# Patient Record
Sex: FEMALE | Race: WHITE | NOT HISPANIC OR LATINO | URBAN - METROPOLITAN AREA
[De-identification: names, ages, dates, MRNs, and addresses within clinical notes are randomized per-mention and may not be internally consistent; named-entity substitution may affect disease eponyms.]

---

## 2022-10-26 ENCOUNTER — OFFICE VISIT (OUTPATIENT)
Dept: PSYCHIATRY | Facility: CLINIC | Age: 82
End: 2022-10-26

## 2022-10-26 DIAGNOSIS — F32.A DEPRESSION, UNSPECIFIED DEPRESSION TYPE: Primary | ICD-10-CM

## 2022-10-26 DIAGNOSIS — F41.9 ANXIETY: ICD-10-CM

## 2022-10-26 RX ORDER — CITALOPRAM 40 MG/1
TABLET ORAL
Qty: 90 TABLET | Refills: 0 | Status: SHIPPED | OUTPATIENT
Start: 2022-10-26

## 2022-10-26 RX ORDER — BUPROPION HYDROCHLORIDE 75 MG/1
TABLET ORAL
Qty: 90 TABLET | Refills: 0 | Status: SHIPPED | OUTPATIENT
Start: 2022-10-26

## 2022-10-26 NOTE — PSYCH
[facility]/Hospital: [address]   [address] [facility]    Psychiatric Progress Note  MRN#: [id number]  Medardo Phillips 79 y o  female    This note was not shared with the patient due to reasonable likelihood of causing patient harm   This Patient was seen in the office today at [place] [address] location  This is Transfer patient of Dr Delfina Le are no longer doing phone appointment  Subjective:  Brandon Lund reported she feels fine  History of depression, immediate depression, don't want to get up in morning-   Appetite normal,  Denies SI  HI  Denies worthlessnesss or anhedonia  On Celexa and Wellbutrin for many years  W/o side effects    Anxiety- regarding  health  Denies recent panic attacks  Have Ativan if really anxious  Last used 3 wks ago but for sleep      Medication   Celexa 40mg  Wellbutrin 75mg  Ativan 0 5mg PRN   Levothyroxine 75 mcg,   Tropolol 25mg     Medical Hx:   PVC  Hypothyroidism   COVID history , had two vaccine and booster      Social Hx:  Living Situation : prison home in [place], [place]  ( fitness center, therapy- physical )   Fall in [place] x 2 months ago- did not break any things  Currently get PT   ADL, iADL- independent, she does all the driving   Occupational Status: retired nurse  Hobbies: quilt , reading     Mental Status Evaluation:  General Appearance:  Medardo Phillips is a 79 y o    female casually dressed, adequate hygiene and grooming, looks stated age   Behavior:  pleasant, cooperative, adequate eye contact   Speech:  normal for rate, rhythm, volume, latency, amount   Mood:  normal   Affect:  normal   Thought Process:  normal and logical   Thought Content:  normal   Perceptual Disturbances: no auditory hallucinations, no visual hallucinations, Does not appear responding or preoccupied   Delusions  No   Risk Potential: Suicidal Ideations No  Homicidal Ideations No  Potential for Aggression No     Sensorium:  Oriented to person, place, time/date and situation   Memory:  recent and remote memory grossly intact   Consciousness:  alert and awake   Attention: attention span and concentration are age appropriate   Insight:  fair   Judgment: fair   Gait/Station: normal   Motor Activity: no abnormal movements     There were no vitals filed for this visit  Medications:   No current outpatient medications on file prior to visit  No current facility-administered medications on file prior to visit  Labs: I have personally reviewed all pertinent laboratory/tests results  Most Recent Labs: No results found for: WBC, RBC, HGB, HCT, PLT, RDW, NEUTROABS, SODIUM, K, CL, CO2, BUN, CREATININE, GLUC, GLUF, CALCIUM, AST, ALT, ALKPHOS, TP, ALB, TBILI, CHOLESTEROL, HDL, TRIG, LDLCALC, NONHDLC, VALPROICTOT, CARBAMAZEPIN, LITHIUM, AMMONIA, [id number], FREET4, T3FREE, PREGSERUM, HCG, HCGQUANT, RPR, HGBA1C, EAG    ________________________________________________________________________    Avel Dakin is a 79 yr old   female, high functioning, adequate ADL's, h/o depression and anxiety, presented with mild symptoms on Celexa and Bupropion  DSM5  Depression, unspecified depression type  Anxiety, unspecified     PLAN:   Discussed diagnotic impression  Continued medications as prescribed       [id number] mg       Bupropion 75 mg         Risks, benefits, and possible side effects of medications explained to patient and patient verbalizes understanding  Next Appointment: 3months      [facility]  Face To Face:  9:50 AM -10:14 AM;Documentation Time:  6:43 PM- 6:50 PM    Encounter Duration: Time Spent 24 minutes  Patient  Greater than 50% of total time was spent with the patient

## 2022-10-26 NOTE — PATIENT INSTRUCTIONS
Breanna Gage [id number]   Thanks for presenting to today's Appointment at [place] [address]   Your medications were not changed    Radha Damon next appointment plans to adjust Celexa and Bupropion

## 2023-01-23 DIAGNOSIS — F32.A DEPRESSION, UNSPECIFIED DEPRESSION TYPE: ICD-10-CM

## 2023-01-23 RX ORDER — BUPROPION HYDROCHLORIDE 75 MG/1
TABLET ORAL
Qty: 90 TABLET | Refills: 0 | Status: SHIPPED | OUTPATIENT
Start: 2023-01-23 | End: 2023-01-26 | Stop reason: SDUPTHER

## 2023-01-23 RX ORDER — BUPROPION HYDROCHLORIDE 75 MG/1
TABLET ORAL
Qty: 90 TABLET | Refills: 0 | OUTPATIENT
Start: 2023-01-23

## 2023-01-23 NOTE — TELEPHONE ENCOUNTER
Pt Darcel Dakin 1940 , chart at [facility] was reviewed , h/o depression , sent Bupropion 75mg to [facility] pharmacy      This note was not shared with the patient due to reasonable likelihood of causing patient harm

## 2023-01-25 NOTE — PSYCH
[facility]/Hospital: [address]   [address] [facility]    Psychiatric Progress Note  MRN#: [id number]  Hiren Cantu 79 y o  female    This note was not shared with the patient due to reasonable likelihood of causing patient harm   This Patient was seen in the office today at [place] [address] location  Subjective:  Evelyn Rivera reported some up and down days, and  has cognitive problem- although its okay, Evelyn Rivera does most of the driving  Sometime anxious- if  is talkative as Evelyn Rivera is more quiet  Anxiety is tolerable  Denies physical symptoms due to anxiety  Denies depression-can't recall last time she was depressed, history of 1 episode - can't recall why  - symptom then was able to function, denies SI, HI  Medication: Evelyn Rivera was able to state medication ( Celexa , Ativan , Wellbutrin) -  Thinks medications are effective  Denies side effects         Mental Status Evaluation:  General Appearance:  Hiren Cantu is a 79 y o    female casually dressed, adequate hygiene and grooming, looks stated age   Behavior:  pleasant, cooperative, adequate eye contact   Speech:  normal for rate, rhythm, volume, latency, amount   Mood:  normal   Affect:  normal   Thought Process:  normal and logical   Thought Content:  normal   Perceptual Disturbances: no auditory hallucinations, no visual hallucinations, Does not appear responding or preoccupied   Delusions  No   Risk Potential: Suicidal Ideations No  Homicidal Ideations No  Potential for Aggression No     Sensorium:  Oriented to person, place, time/date and situation   Memory:  recent and remote memory grossly intact   Consciousness:  alert and awake   Attention: attention span and concentration are age appropriate   Insight:  fair   Judgment: fair   Gait/Station: normal   Motor Activity: no abnormal movements     There were no vitals filed for this visit  Medications:   Current Outpatient Medications on File Prior to Visit   Medication Sig Dispense Refill   • buPROPion (WELLBUTRIN) 75 mg tablet Bupropion 75mg : 1 tablet in the morning 90 tablet 0   • citalopram (CeleXA) 40 mg tablet Citalopram 40m tablet by mouth per night 90 tablet 0     No current facility-administered medications on file prior to visit  Labs: I have personally reviewed all pertinent laboratory/tests results  Most Recent Labs: No results found for: WBC, RBC, HGB, HCT, PLT, RDW, NEUTROABS, SODIUM, K, CL, CO2, BUN, CREATININE, GLUC, GLUF, CALCIUM, AST, ALT, ALKPHOS, TP, ALB, TBILI, CHOLESTEROL, HDL, TRIG, LDLCALC, NONHDLC, VALPROICTOT, CARBAMAZEPIN, LITHIUM, AMMONIA, [id number], FREET4, T3FREE, PREGSERUM, HCG, HCGQUANT, RPR, HGBA1C, EAG    ________________________________________________________________________    Opal Galindo is a 79 yr old   female, high functioning, adequate ADL's, h/o depression and anxiety, presented with mild symptoms on Celexa and Bupropion  Today, with stable mood   DSM5  Depression, unspecified depression type  Anxiety, unspecified     PLAN:   Discussed diagnotic impression based on history , external records and clinical findings    [facility]   •  buPROPion (WELLBUTRIN) 75 mg tablet, Bupropion 75mg : 1 tablet in the morning, Disp: 120 tablet, Rfl: 0  •  citalopram (CeleXA) 40 mg tablet, Citalopram 40m tablet po at night, Disp: 120 tablet, Rfl: 0              Treatement: Risks, benefits, and possible side effects of medications explained to patient and patient verbalizes understanding  Next Appointment SLPF: 3months    Today's Appointment@ [facility]  Face To Face:  10:58 AM -11:17 AM;Documentation Time:  5:08 PM- 5:10 PM     Encounter Duration: Time Spent 19 minutes with Patient  Greater than 50% of total time was spent with the patient       MDM  Number of Diagnoses or Management Options  Depression, unspecified depression type: established, improving     Amount and/or Complexity of Data Reviewed  Review and summarize past medical records: yes    Risk of Complications, Morbidity, and/or Mortality  Presenting problems: low  Management options: low

## 2023-01-25 NOTE — PATIENT INSTRUCTIONS
Ronnie Jackson [id number]   Thanks for presenting to today's Appointment at [facility]   Your medications were not changed

## 2023-01-26 ENCOUNTER — OFFICE VISIT (OUTPATIENT)
Dept: PSYCHIATRY | Facility: CLINIC | Age: 83
End: 2023-01-26

## 2023-01-26 DIAGNOSIS — F32.A DEPRESSION, UNSPECIFIED DEPRESSION TYPE: ICD-10-CM

## 2023-01-26 RX ORDER — BUPROPION HYDROCHLORIDE 75 MG/1
TABLET ORAL
Qty: 120 TABLET | Refills: 0 | Status: SHIPPED | OUTPATIENT
Start: 2023-01-26

## 2023-01-26 RX ORDER — CITALOPRAM 40 MG/1
TABLET ORAL
Qty: 120 TABLET | Refills: 0 | Status: SHIPPED | OUTPATIENT
Start: 2023-01-26

## 2023-06-05 DIAGNOSIS — F32.A DEPRESSION, UNSPECIFIED DEPRESSION TYPE: ICD-10-CM

## 2023-06-05 RX ORDER — BUPROPION HYDROCHLORIDE 75 MG/1
TABLET ORAL
Qty: 30 TABLET | Refills: 1 | Status: SHIPPED | OUTPATIENT
Start: 2023-06-05

## 2023-06-05 RX ORDER — CITALOPRAM 40 MG/1
TABLET ORAL
Qty: 30 TABLET | Refills: 1 | Status: SHIPPED | OUTPATIENT
Start: 2023-06-05

## 2023-06-05 NOTE — TELEPHONE ENCOUNTER
Patient of Dr Sabas Covarrubias, who is currently unavailable, requesting RF  Forwarding to available prescriber

## 2023-07-06 DIAGNOSIS — F32.A DEPRESSION, UNSPECIFIED DEPRESSION TYPE: ICD-10-CM

## 2023-07-06 NOTE — TELEPHONE ENCOUNTER
Medication Refill Request     Name of Medication Wellbutrin   Dose/Frequency 75mg daily   Quantity 30  Verified pharmacy   [x]  Verified ordering Provider   [x]  Does patient have enough for the next 3 days? Yes [x] No []  Does patient have a follow-up appointment scheduled?  Yes [x] No []   If so when is appointment: Opal Renner

## 2023-07-07 RX ORDER — BUPROPION HYDROCHLORIDE 75 MG/1
TABLET ORAL
Qty: 30 TABLET | Refills: 1 | Status: SHIPPED | OUTPATIENT
Start: 2023-07-07

## 2023-08-29 DIAGNOSIS — F32.A DEPRESSION, UNSPECIFIED DEPRESSION TYPE: ICD-10-CM

## 2023-08-29 RX ORDER — BUPROPION HYDROCHLORIDE 75 MG/1
TABLET ORAL
Qty: 7 TABLET | Refills: 1 | Status: SHIPPED | OUTPATIENT
Start: 2023-08-29 | End: 2023-09-08 | Stop reason: SDUPTHER

## 2023-09-07 NOTE — PSYCH
[facility]/Hospital: [address] [place] [address], [address] [facility]    Psychiatric Progress Note  MRN#: [id number]  Hillary Mackay 79 y.o. female    This note was not shared with the patient due to reasonable likelihood of causing patient harm   _________________________________________________________________________________________________________________________________  OFFICE APPOINTMENT   Seen today at [address] [facility] location                                                Patient Hillary Mackay ,1940   Prescriber/Physician: Dewey Pal DO Physician Location:   [address] [facility] [address] [place] [address]     • This service was provided in the office. Patient is currently located in the [place], where I am  licensed. • Patient gave consent to proceed with encounter; acknowledge understanding of security and privacy of encounter   • Patient identity was verified as well as the [facility] chart  • Patient verbalized understanding evaluation only involves Psychiatric diagnosing, prescribing, result monitoring   • Patient was informed this is a billable service and legal  ___________________________________________________________________________________________________________________________________     Chief Complaint   Patient presents with   • Depression       Subjective:  Sobia Zambrano complained of down mood; later described as not wanting to do things, low energy, impatience also with the  -typically if he's walking slowly. Sobia Zambrano also reasoned sadness is caused limited activities with the  as they once did. 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 Sobia Zambrano also is  8wks post-opts left knee replacement surgery- currently without pain.  Initially required a walker now she uses a cane and also has supports from the  and daughter regarding activities of daily livings. She had 1 recent fall x 1 wk ago-while "gardening and attempting to grab something- denies having head trauma. Currently oriented x 3       ROS:  Depression: defer to [facility], 4/10 severity, not sure if things would improve. Anxiety: denies nerves , jitters , worries  Irritability: denies   Sleep: 'wonderful", duration 11pm- 7:30am  Energy: defer to above  Psychosis:  SI/HI  : denies     Medication: Allan Alcala is  Compliant Celexa 40mg and Bupropion 75mg   Medication s/e: denies      Medical ROS:   Constitutional: negative for chills and fevers  Respiratory: negative for wheezing and SOB  Cardiovascular: negative for chest pain, chest pressure/discomfort and palpitations  Genitourinary:negative for dysuria, frequency, hematuria and discharge. Aamir Arriaga recently had UTI, completed course of Bactrim    Pertinent items are noted in above all other symptoms are negative       Pt Allan Alcala) verified MHX and med list during Encounter:    Past Medical History:   Diagnosis Date   • Hypothyroid    • PVC (premature ventricular contraction)        Current Outpatient Medications on File Prior to Visit   Medication Sig Dispense Refill   • levothyroxine 75 mcg tablet Take 75 mcg by mouth daily     • metoprolol succinate (TOPROL-XL) 25 mg 24 hr tablet Take 25 mg by mouth daily     • [ buPROPion (WELLBUTRIN) 75 mg tablet Bupropion 75mg : 1 tablet in the morning 7 tablet 1   •  citalopram (CeleXA) 40 mg tablet Citalopram 40m tablet po at night 30 tablet 1         Mental Status Evaluation:  General Appearance:  Allan Alcala is a 79 y.o.   female casually dressed, adequate hygiene and grooming, looks stated age   Behavior:  pleasant, cooperative, adequate eye contact   Speech:  normal for rate, rhythm, volume, latency, amount   Mood:  normal   Affect:  normal   Thought Process:  normal and logical   Thought Content: normal   Perceptual Disturbances: no auditory hallucinations, no visual hallucinations, Does not appear responding or preoccupied   Delusions  No   Risk Potential: Suicidal Ideations No  Homicidal Ideations No  Potential for Aggression No     Sensorium:  Oriented to person, place, time/date and situation   Memory:  recent and remote memory grossly intact   Consciousness:  alert and awake   Attention: attention span and concentration are age appropriate   Insight:  fair   Judgment: fair   Gait/Station: normal   Motor Activity: no abnormal movements     There were no vitals filed for this visit. Medications:   Current Outpatient Medications on File Prior to Visit   Medication Sig Dispense Refill   • buPROPion (WELLBUTRIN) 75 mg tablet Bupropion 75mg : 1 tablet in the morning 7 tablet 1   • citalopram (CeleXA) 40 mg tablet Citalopram 40m tablet po at night 30 tablet 1     No current facility-administered medications on file prior to visit. Labs: I have personally reviewed all pertinent laboratory/tests results. Most Recent Labs: No results found for: "WBC", "RBC", "HGB", "HCT", "PLT", "RDW", "NEUTROABS", "SODIUM", "K", "CL", "CO2", "BUN", "CREATININE", "GLUC", "GLUF", "CALCIUM", "AST", "ALT", "ALKPHOS", "TP", "ALB", "TBILI", "CHOLESTEROL", "HDL", "TRIG", "LDLCALC", "[facility]", "VALPROICTOT", "CARBAMAZEPIN", "LITHIUM", "AMMONIA", "[id number]", "Adolfo Romp", "[facility]", "PREGSERUM", "HCG", "HCGQUANT", "RPR", "HGBA1C", "EAG"    ________________________________________________________________________    A/P  Mckayla Price, is a 79 y.o.   female, high functioning, h/o depression and anxiety, presented with mild symptoms while on  Bupropion and Celexa. Currently, with depressed mood, hopelessness, anhedonia, low severity- low PHQ 9 score /. Devoid of SI, plan or intent. Has social supports (familal).   Not committable for hospitalization      DSM5  Depression, unspecified depression type  Anxiety, unspecified     PLAN:   History and external records were reviewed. Discussed clinical findings and diagnotic impression mild depression. Did not change pt Sandi Neat) meds. Plans to monitor, if symptoms increase w/ intense - then plans for adjustments    [facility]   -     citalopram (CeleXA) 40 mg tablet; Citalopram 40m tablet po at night  -     buPROPion (WELLBUTRIN) 75 mg tablet; Bupropion 75mg : 1 tablet in the morning            Treatement: Risks, benefits, and possible side effects of medications explained to patient and patient verbalizes understanding. Next Appointment SLPF: 3 months    Today's Appointment@ [facility]  Face To Face:  11:09AM- 11:36AM ;Documentation Time:  12:00PM- 12;14PM    Encounter Duration: Time Spent 27 mins with Patient. Greater than 50% of total time was spent with the patient       MDM  Number of Diagnoses or Management Options  Anxiety: established, improving  Depression, unspecified depression type: established, worsening     Amount and/or Complexity of Data Reviewed  Review and summarize past medical records: yes    Risk of Complications, Morbidity, and/or Mortality  Presenting problems: low  Diagnostic procedures: moderate  Management options: moderate        This note may have been written with the assistance of dictation software.  Please excuse any grammatical  errors, misspellings,  and abnormal spacing of letters , sentences or paragraphs

## 2023-09-07 NOTE — PATIENT INSTRUCTIONS
Terry Dolan [id number]   Thanks for presenting to today's Appointment at [facility]  Your medications were not changed

## 2023-09-08 ENCOUNTER — OFFICE VISIT (OUTPATIENT)
Dept: PSYCHIATRY | Facility: CLINIC | Age: 83
End: 2023-09-08
Payer: MEDICARE

## 2023-09-08 DIAGNOSIS — F41.9 ANXIETY: ICD-10-CM

## 2023-09-08 DIAGNOSIS — F32.A DEPRESSION, UNSPECIFIED DEPRESSION TYPE: Primary | ICD-10-CM

## 2023-09-08 PROCEDURE — 99213 OFFICE O/P EST LOW 20 MIN: CPT | Performed by: PSYCHIATRY & NEUROLOGY

## 2023-09-08 RX ORDER — BUPROPION HYDROCHLORIDE 75 MG/1
TABLET ORAL
Qty: 30 TABLET | Refills: 3 | Status: SHIPPED | OUTPATIENT
Start: 2023-09-08

## 2023-09-08 RX ORDER — CITALOPRAM 40 MG/1
TABLET ORAL
Qty: 30 TABLET | Refills: 3 | Status: SHIPPED | OUTPATIENT
Start: 2023-09-08

## 2023-09-08 RX ORDER — LEVOTHYROXINE SODIUM 0.07 MG/1
75 TABLET ORAL DAILY
COMMUNITY
Start: 2023-08-14

## 2023-09-08 RX ORDER — METOPROLOL SUCCINATE 25 MG/1
25 TABLET, EXTENDED RELEASE ORAL DAILY
COMMUNITY
Start: 2023-06-23

## 2023-09-08 NOTE — BH TREATMENT PLAN
TREATMENT PLAN                                                                         [address]          This note was not shared with the patient due to reasonable likelihood of causing patient harm     Name and Date of Birth:  Leobardo Arvin 79 y.o. 1940   Date of Treatment Plan: September 8, 2023  Diagnosis/Diagnoses:    1. Depression, unspecified depression type    2. Anxiety        Strengths/Personal Resources for Self-Care: Leighann Wakefield has familial supports, reliance, ability to listen, ability to reason, adequate fund of knowledge. Area/Areas of need (in own words): depressive symptoms and anxiety symptoms   1)  Long Term Goal:   •        decrease depression. Maintain stability of anxiety   Goal Date: 1 yr-2 yrs  Person/Persons responsible for completion of goal: Leobardo Arvin    2.  Short Term Objective (s) - How to reach this goal?:   •         Recommended treatment: Adherence to antidepressants and antianxiety due to diagnoses  •         Routine monitoring of symptom reduction  •         Routine monitoring of labs if necessary   •         CRISIS intervention/Acute Hospitalization if necessary   Goal Date: 6 months- 1yr  Person/Persons Responsible for Completion of Goal: Leobardo Arvin    Progress Towards Goals: progressing  Treatment Modality: routine appointment q 1-3 months at [facility]  Review due 180 days from date of this plan: 6 months - 3/8/2024  Expected length of service: 1-3yrs

## 2023-10-04 DIAGNOSIS — F32.A DEPRESSION, UNSPECIFIED DEPRESSION TYPE: ICD-10-CM

## 2023-10-05 RX ORDER — BUPROPION HYDROCHLORIDE 75 MG/1
TABLET ORAL
Qty: 90 TABLET | Refills: 2 | OUTPATIENT
Start: 2023-10-05

## 2023-10-05 RX ORDER — BUPROPION HYDROCHLORIDE 75 MG/1
TABLET ORAL
Qty: 90 TABLET | Refills: 0 | Status: SHIPPED | OUTPATIENT
Start: 2023-10-05

## 2023-10-05 NOTE — TELEPHONE ENCOUNTER
Pt Tran Chandler, 1940, [facility] chart was reviewed.  Bupropion 75 qty 30 was voided, sent 90qty to [facility] pharmacy    No This note was not shared with the patient due to reasonable likelihood of causing patient harm

## 2023-12-08 NOTE — PSYCH
[facility]/Hospital: [address] [place] [address]  [address], [address] [facility]    Psychiatric Progress Note  MRN#: [id number]  Vinicio Go 79 y.o. female    This note was not shared with the patient due to reasonable likelihood of causing patient harm   _________________________________________________________________________________________________________________________________  OFFICE APPOINTMENT   Seen today at [address] [facility] location                                                Patient Vinicio Go ,1940   Prescriber/Physician: Nafisa Good DO Physician Location:   [address] [facility] [address] [place] [address]     This service was provided in the office. Patient is currently located in the [address], where I am  licensed. Patient gave consent to proceed with encounter; acknowledge understanding of security and privacy of encounter   [facility]  Patient verbalized understanding evaluation only involves Psychiatric diagnosing, prescribing, result monitoring   Patient was informed this is a billable service and legal  ___________________________________________________________________________________________________________________________________     Chief Complaint   Patient presents with   • Anxiety       Subjective:  Mary complained of slight anxiety, worries about  cognitive problems and weakens  in legs. Also reported mild depression , although without anhedonia as Vinicio Go enjoy activities - sometimes gardens, reads or quilting . Complained of recent forgetfulness with names although recalls ways someone appears. Denies recent falls or dizziness.    Otherwise reports ADL's as independent , [place] De Jesus drives , has cleaning lady who comes to the home    ROS:  Depression:  defer to above  Anxiety:  defer to above  Irritability: denies  Sleep:  stable   SI/HI  :  denies   Appetite-stable      Medication: Elsa Soriano is  Compliant Celexa 40mg and Bupropion 75mg   Medication s/e: denies      Medical ROS:   Constitutional: negative for chills and fevers  Respiratory: negative for wheezing and SOB  Cardiovascular: negative for chest pain,  and palpitations  Genitourinary:negative for dysuria, frequency since UTI and completion of Bactrium. Neuro: w/o recent falls , sometime slight forgetfulness's  Pertinent items are noted in above all other symptoms are negative         Current Outpatient Medications on File Prior to Visit   Medication Sig Dispense Refill   • levothyroxine 75 mcg tablet Take 75 mcg by mouth daily     • metoprolol succinate (TOPROL-XL) 25 mg 24 hr tablet Take 25 mg by mouth daily     • [ buPROPion (WELLBUTRIN) 75 mg tablet Bupropion 75mg : 1 tablet in the morning 7 tablet 1   •  citalopram (CeleXA) 40 mg tablet Citalopram 40m tablet po at night 30 tablet 1         Mental Status Evaluation:  General Appearance:  Elsa Soriano is a 79 y.o.   female casually dressed, adequate hygiene and grooming, looks stated age   Behavior:  pleasant, cooperative, adequate eye contact   Speech:  normal for rate, rhythm, volume, latency, amount   Mood:  Mild anxious and depressed   Affect:  normal   Thought Process:  normal and logical   Thought Content:  no overt delusions, normal   Perceptual Disturbances: Does not appear responding or preoccupied   Delusions  w/o   Risk Potential: Suicidal Ideations w/o  Homicidal Ideations w/o  Potential for Aggression  w/o   Sensorium:  Oriented to person, place [facility]) time/date ( Dec 11, 2023),situation,    Memory:  recent and remote memory grossly intact   Consciousness:  alert and awake   Attention: attention span and concentration are age appropriate   Insight:  appropriate   Judgment: appropriate   Gait/Station: normal   Motor Activity: no abnormal movements     There were no vitals filed for this visit. Medications:   Current Outpatient Medications on File Prior to Visit   Medication Sig Dispense Refill   • buPROPion (WELLBUTRIN) 75 mg tablet Bupropion 75mg : 1 tablet in the morning 90 tablet 0   • citalopram (CeleXA) 40 mg tablet Citalopram 40m tablet po at night 30 tablet 3   • levothyroxine 75 mcg tablet Take 75 mcg by mouth daily     • metoprolol succinate (TOPROL-XL) 25 mg 24 hr tablet Take 25 mg by mouth daily       No current facility-administered medications on file prior to visit. Labs: I have personally reviewed all pertinent laboratory/tests results. Most Recent Labs: No results found for: "WBC", "RBC", "HGB", "HCT", "PLT", "RDW", "NEUTROABS", "SODIUM", "K", "CL", "CO2", "BUN", "CREATININE", "GLUC", "GLUF", "CALCIUM", "AST", "ALT", "ALKPHOS", "TP", "ALB", "TBILI", "CHOLESTEROL", "HDL", "TRIG", "LDLCALC", "[facility]", "VALPROICTOT", "CARBAMAZEPIN", "LITHIUM", "AMMONIA", "[id number]", "[place]roy Gums", "Clydie Nail", "PREGSERUM", "HCG", "HCGQUANT", "RPR", "HGBA1C", "EAG"    ________________________________________________________________________    A/P  Hayley Rowland, is a 79 y.o.   female, high functioning, h/o depression and anxiety, presented with mild symptoms while on  Bupropion and Celexa . There was recent findings of  depressed mood, hopelessness, anhedonia. Currently with anxiety> depressed although mild intensity. Devoid of SI, plan or intent. DSM5  1. Anxiety    2. Depression, unspecified depression type          PLAN:   History and external records were reviewed. Discussed clinical findings and diagnotic impression mild anxiety> depression.    Did not change medications      Medication Prescribed During This Encounter at [facility]:     -     buPROPion (WELLBUTRIN) 75 mg tablet; Bupropion 75mg : 1 tablet in the morning  -     citalopram (CeleXA) 40 mg tablet; Citalopram 40m tablet po at night                 Treatement: Risks, benefits, and possible side effects of medications explained to patient and patient verbalizes understanding. Next Appointment [facility]: 3 months    Today's Appointment@ [facility]  Patient Encounter Time : 2:11- 2:39PM     Encounter Duration: Time Spent 27 mins with Patient. Greater than 50% of total time was spent with the patient       MDM  Number of Diagnoses or Management Options  Diagnosis management comments: 2       Amount and/or Complexity of Data Reviewed  Review and summarize past medical records: yes    Risk of Complications, Morbidity, and/or Mortality  Presenting problems: low  Diagnostic procedures: moderate  Management options: moderate          This note may have been written with the assistance of dictation software. Please excuse any grammatical  errors, misspellings,  and abnormal spacing of letters , sentences or paragraphs .  For accurate interpretation should read note horizontally

## 2023-12-08 NOTE — PATIENT INSTRUCTIONS
Mariposa Ellis [id number]   Thanks for presenting to today's Appointment at [facility]  Your medications were not changed

## 2023-12-11 ENCOUNTER — OFFICE VISIT (OUTPATIENT)
Dept: PSYCHIATRY | Facility: CLINIC | Age: 83
End: 2023-12-11
Payer: MEDICARE

## 2023-12-11 DIAGNOSIS — F41.9 ANXIETY: Primary | ICD-10-CM

## 2023-12-11 DIAGNOSIS — F32.A DEPRESSION, UNSPECIFIED DEPRESSION TYPE: ICD-10-CM

## 2023-12-11 PROCEDURE — 99213 OFFICE O/P EST LOW 20 MIN: CPT | Performed by: PSYCHIATRY & NEUROLOGY

## 2023-12-11 RX ORDER — BUPROPION HYDROCHLORIDE 75 MG/1
TABLET ORAL
Qty: 90 TABLET | Refills: 0 | Status: SHIPPED | OUTPATIENT
Start: 2023-12-11

## 2023-12-11 RX ORDER — CITALOPRAM 40 MG/1
TABLET ORAL
Qty: 90 TABLET | Refills: 0 | Status: SHIPPED | OUTPATIENT
Start: 2023-12-11

## 2024-03-08 DIAGNOSIS — F32.A DEPRESSION, UNSPECIFIED DEPRESSION TYPE: ICD-10-CM

## 2024-03-08 RX ORDER — CITALOPRAM 40 MG/1
TABLET ORAL
Qty: 90 TABLET | Refills: 0 | OUTPATIENT
Start: 2024-03-08

## 2024-03-08 RX ORDER — CITALOPRAM 40 MG/1
TABLET ORAL
Qty: 90 TABLET | Refills: 0 | Status: SHIPPED | OUTPATIENT
Start: 2024-03-08

## 2024-03-08 NOTE — TELEPHONE ENCOUNTER
Pt Mary Ward, 1940, [facility] chart was reviewed. Citalopram 40mg sent 90qty to [facility] pharmacy    This note was not shared with the patient due to reasonable likelihood of causing patient harm

## 2024-03-30 DIAGNOSIS — F32.A DEPRESSION, UNSPECIFIED DEPRESSION TYPE: ICD-10-CM

## 2024-04-01 RX ORDER — BUPROPION HYDROCHLORIDE 75 MG/1
TABLET ORAL
Qty: 90 TABLET | Refills: 0 | OUTPATIENT
Start: 2024-04-01

## 2024-04-01 RX ORDER — BUPROPION HYDROCHLORIDE 75 MG/1
TABLET ORAL
Qty: 30 TABLET | Refills: 1 | Status: SHIPPED | OUTPATIENT
Start: 2024-04-01 | End: 2024-04-01 | Stop reason: SDUPTHER

## 2024-04-01 RX ORDER — BUPROPION HYDROCHLORIDE 75 MG/1
TABLET ORAL
Qty: 30 TABLET | Refills: 1 | Status: SHIPPED | OUTPATIENT
Start: 2024-04-01

## 2024-04-01 NOTE — TELEPHONE ENCOUNTER
Pt Mary Ward, 1940, [facility] chart was reviewed. Bupropion 75mg, qty 30 1rf to [facility] pharmacy    This note was not shared with the patient due to reasonable likelihood of causing patient harm      This note may have been written with the assistance of dictation software. Please excuse any grammatical  errors, misspellings,  and abnormal spacing of letters , sentences or paragraphs . For accurate interpretation should read note horizontally

## 2024-04-19 NOTE — PSYCH
[facility]/Hospital: [facility]   [address] [facility] [address] [phone]    Psychiatric Progress Note  MRN#: [id number]  Mary Ward 83 y.o. female    This note was not shared with the patient due to reasonable likelihood of causing patient harm   _________________________________________________________________________________________________________________________________  OFFICE APPOINTMENT   Seen today at [facility] location                                                Patient Mary Ward ,1940   Prescriber/Physician: Gui Farmer DO Physician Location:   [facility] OUTPATIENT  [address] [facility] [address]     This service was provided in the office.  Patient is currently located in the [place], where I am  licensed.   Patient gave consent to proceed with encounter; acknowledge understanding of security and privacy of encounter   Patient identity was verified as well as the [facility] chart  Patient verbalized understanding evaluation only involves Psychiatric diagnosing, prescribing, result monitoring   Patient was informed this is a billable service and legal  ___________________________________________________________________________________________________________________________________     Reason for Visit:   Chief Complaint   Patient presents with   • Anxiety       Subjective:  Mary Ward rated  depression  and anxiety ; 3-4 out of 10 ;  reported on worries about the  who has memory problems.   Otherwise Mary listed hobbies of enjoyment as  library and quilting, trying to stay busy.    ADLs independent, has cleaning lady every 2 wks,  iADL - drives without injuries     ROS:   Irritability:  Mary Ward slight frustration with caring for the  , who hard of hearing   Sleep: denies   SI/HI  :  denies   Appetite: stable       Medication: Mary  Sylvia is  Compliant Celexa 40mg and Bupropion 75mg   Medication s/e: denies      Medical ROS:   Constitutional: negative for chills and fevers  Gastrointestinal: negative for abdominal pain, constipation, nausea, and vomiting  Neurological: negative for dizziness, headaches, and without recent falls, although Mary Ward has slight difficulty remembering names     Pertinent items are noted in HPI, all other symptoms are negative       Pt Mary Ward verified medications List during [facility] Encounter   Current Outpatient Medications on File Prior to Visit   Medication Sig Dispense Refill   • Cholecalciferol 25 MCG (1000 UT) capsule Take 1 capsule by mouth daily     • Zinc 50 MG TABS Take 50 mg by mouth every 24 hours     • buPROPion (WELLBUTRIN) 75 mg tablet Bupropion 75mg : 1 tablet in the morning 30 tablet 1   • citalopram (CeleXA) 40 mg tablet Citalopram 40m tablet po at night 90 tablet 0   • levothyroxine 75 mcg tablet Take 75 mcg by mouth daily     • metoprolol succinate (TOPROL-XL) 25 mg 24 hr tablet Take 25 mg by mouth daily       No current facility-administered medications on file prior to visit.       Mental Status Evaluation:  General Appearance:  Mary Ward is a 83 y.o.  female casually dressed, looks stated age   Behavior:  pleasant, cooperative, adequate eye gaze   Speech:  normal for rate, rhythm, volume, latency, amount   Mood:  Normal   Affect:  Anxiety    Thought Process:  circumstantial, logical, and tangential   Thought Content:  no overt delusions, normal, ruminations slightly    Perceptual Disturbances: Does not appear responding or preoccupied   Delusions  w/o   Risk Potential: Suicidal Ideations w/o  Homicidal Ideations w/o  Potential for Aggression  w/o   Sensorium:  Oriented to person, place ( [facility]) time/date ( 2024),situation,    Memory:  recent and remote memory grossly intact   Consciousness:  alert and awake   Attention: attention span  "and concentration are appropriate   Insight:  appropriate   Judgment: appropriate   Gait/Station: normal   Motor Activity: no abnormal movements     There were no vitals filed for this visit.     Medications:   Current Outpatient Medications on File Prior to Visit   Medication Sig Dispense Refill   • buPROPion (WELLBUTRIN) 75 mg tablet Bupropion 75mg : 1 tablet in the morning 30 tablet 1   • citalopram (CeleXA) 40 mg tablet Citalopram 40m tablet po at night 90 tablet 0   • levothyroxine 75 mcg tablet Take 75 mcg by mouth daily     • metoprolol succinate (TOPROL-XL) 25 mg 24 hr tablet Take 25 mg by mouth daily       No current facility-administered medications on file prior to visit.        Labs: I have personally reviewed all pertinent laboratory/tests results. Most Recent Labs: No results found for: \"WBC\", \"RBC\", \"HGB\", \"HCT\", \"PLT\", \"RDW\", \"NEUTROABS\", \"SODIUM\", \"K\", \"CL\", \"CO2\", \"BUN\", \"CREATININE\", \"GLUC\", \"GLUF\", \"CALCIUM\", \"AST\", \"ALT\", \"ALKPHOS\", \"TP\", \"ALB\", \"TBILI\", \"CHOLESTEROL\", \"HDL\", \"TRIG\", \"LDLCALC\", \"NONHDLC\", \"VALPROICTOT\", \"CARBAMAZEPIN\", \"LITHIUM\", \"AMMONIA\", \"[id number]\", \"FREET4\", \"T3FREE\", \"PREGSERUM\", \"HCG\", \"HCGQUANT\", \"RPR\", \"HGBA1C\", \"EAG\"    ________________________________________________________________________    A/P  Mary Ward, is a 83 y.o.   female, high functioning, h/o depression and anxiety, presented  on  Bupropion and Celexa . Currently , Mary Ward has with mild anxiety with frustration linked to caring for  , otherwise with supports available.  Devoid of SI, plan or intent     DSM5  1. Unspecified Anxiety    2. Depression, unspecified depression type           PLAN:   History and external records were reviewed. Discussed clinical findings and diagnotic impression ; slight anxiety   Did not change medications  Pt Mary Ward completed Treatment Plan and Crisis Plan, also Mary Ward signed forms      Medication Prescribed During This " Encounter at [facility]:     -     buPROPion (WELLBUTRIN) 75 mg tablet; Bupropion 75mg : 1 tablet in the morning    Medication List Also:  -     citalopram (CeleXA) 40 mg tablet; Citalopram 40m tablet po at night                 Treatement: Risks, benefits, and possible side effects of medications explained to patient and patient verbalizes understanding.      Next Appointment [facility]: 3 months  ___________________________________________________________________________________  Patient Encounter : 10:01- 10:46    Encounter Duration: Time Spent 45 mins with Patient.Greater than 50% of total time was spent with the patient       MDM  Number of Diagnoses or Management Options  Diagnosis management comments: 2       Amount and/or Complexity of Data Reviewed  Review and summarize past medical records: yes    Risk of Complications, Morbidity, and/or Mortality  Presenting problems: low  Diagnostic procedures: moderate  Management options: moderate          This note may have been written with the assistance of dictation software. Please excuse any grammatical  errors, misspellings,  and abnormal spacing of letters , sentences or paragraphs . For accurate interpretation should read note horizontally

## 2024-04-19 NOTE — PATIENT INSTRUCTIONS
Mayr Ward [id number]   Thanks for presenting to today's Appointment at [facility]  Your medications were not changed

## 2024-04-22 ENCOUNTER — OFFICE VISIT (OUTPATIENT)
Dept: PSYCHIATRY | Facility: CLINIC | Age: 84
End: 2024-04-22
Payer: MEDICARE

## 2024-04-22 DIAGNOSIS — F41.9 ANXIETY: Primary | ICD-10-CM

## 2024-04-22 DIAGNOSIS — F32.A DEPRESSION, UNSPECIFIED DEPRESSION TYPE: ICD-10-CM

## 2024-04-22 PROCEDURE — 99213 OFFICE O/P EST LOW 20 MIN: CPT | Performed by: PSYCHIATRY & NEUROLOGY

## 2024-04-22 RX ORDER — GINSENG 100 MG
50 CAPSULE ORAL EVERY 24 HOURS
COMMUNITY

## 2024-04-22 RX ORDER — BUPROPION HYDROCHLORIDE 75 MG/1
TABLET ORAL
Qty: 90 TABLET | Refills: 0 | Status: SHIPPED | OUTPATIENT
Start: 2024-04-22

## 2024-04-22 NOTE — BH CRISIS PLAN
"[facility]    Client Name: Mary Ward , female      Client YOB: 1940     CRISIS PLAN Creation Date: 04/22/24 and CRISIS PLAN Review Date : 1 yr -4/22/2025  ____________________________________________________________________________________________________________  [facility] [employer]ST[employer] SAFETY PLAN      Step 1: Warning Signs:   Mary Ward never had suicide thoughts ; although labeled crisis as anxiety, and stated:  Frustration         Step 2: Internal Coping Strategies:    Mary Ward  Reading   Playing Piano   Playing on Phone         Step 3: People and social settings that provide distraction:                    Names                                                                                                    Contacts   Chalino Ward has women group and chat about anything                   Located where Chalino Lives                                                                                                       Places:   Chalino was not able to answer question                                                                                          Step 4: People whom I can ask for help during a crisis:Ask: “Who can you contact to ask for help during a crisis and how would you contact them (include phone number or other way to contact them, e.g., text message)? Who can you share the safety plan with to help you during a crisis?”                              Name                                                                Contact  Lori Lopez ( Chalino daughter)                       Mary Ward has access to number: [phone]  Telmaconi Ward( Mary daughter in law)           \"        \"           \"         \"           \"        \":[phone]        Step 5: Professionals or agencies I can contact during a crisis:Ask: “What are the names of health care professionals, agencies, hospitals or other organizations that you " can contact during a crisis and how will you contact them (include phone number or other way to contact them)?”    Clinician/Agency Name:                          Phone                          Emergency Contact   [facility] in [place]                              [phone]            Lori Lopez, and Chalino son Steven Palomino  [phone]                         Local Emergency Department:                              Emergency Dept Phone                  Emergency Dept Address     [facility]                                              Mary Ward has access to number: [phone]                                                                                            CRISIS PHONE NUMBERS   Suicide Prevention Lifeline: Call 7962-811-KYPF or Text  500 Crisis Text Line: Text HOME to 254-416   Please note: Some [facility] do not have a separate number for Child/Adolescent specific crisis. If your county is not listed under Child/Adolescent, please call the adult number for your county      Adult Crisis Numbers: Child/Adolescent Crisis Numbers   [facility]: [phone] [facility]: [phone]   [facility]: [phone] [facility]: [phone]   [facility]: [phone] [place]: [phone]   [facility]: [phone] [place]: [phone]   [place]: [phone]   [facility]: [phone]   [facility]: [phone]   [facility]: [phone] (daytime) [phone] (after hours, weekends, holidays)      Step 6: Making the environment safer (plan for lethal means safety):Ask: “For each lethal method that is identified, what is the specific plan to reduce access to this lethal method so that time will pass, your suicide feelings will diminish and it will be less likely that you will actually kill yourself? Who may assist you with this plan to make your environment safer?”    Patient did not identify any  lethal methods              Optional:What is most important to me and worth living for?   Mary Ward you stated : Look forwards to future         Gaurav Safety Plan. Evelyn Vora and Sereg Ayon ;  Use permission via  authors  ____________________________________________________________________________________________________________    This CRISIS plan was formulated via my Physician/ Psychiatrist and I . I,Mary Ward patient ,   understand and accept the CRISIS plan  developed for my treatment.     This note may have been written with the assistance of dictation software. Please excuse any grammatical  errors, misspellings,  and abnormal spacing of letters , sentences or paragraphs . For accurate interpretation should read note horizontally

## 2024-04-22 NOTE — BH TREATMENT PLAN
TREATMENT PLAN                                                                         [facility]    This note was not shared with the patient due to reasonable likelihood of causing patient harm  Name and Date of Birth:  Mary Ward , female , 83 y.o. 1940   Date of Treatment Plan: April 22, 2024  Diagnosis/Diagnoses:    1. Unspecified Anxiety    2. Depression, unspecified depression type        Strengths/Personal Resources for Self-Care: Mary Ward has family supports listed in CP, also Mary has hobbies ( reading and quilting )  Area/Areas of need (in own words): Mary Ward stated goals as improvement of emotional and physical health, although knowing can't reach baseline as previously    1)  Long Term Goal:          Maintaining improvement of depression and anxiety    Reach Goal Date: 1 yr-2 yrs  Person/Persons responsible for completion of goal: Mary Ward    2. Short Term Objective (s) - How to reach this goal?:           Recommended treatment: Adherence to antidepressants and antianxiety due to diagnoses: Medications : Citalopram 40mg, Bupropion 75mg          [facility] response           Routine monitoring of labs if necessary           CRISIS intervention/Acute Hospitalization if necessary   Reach Goal Date: 6 months- 1yr  Person/Persons Responsible for Completion of Goal: Mary Ward    Progress Towards Goals: progressing  Treatment Modality: [facility]  Review due 180 days from date of this plan: 6 months - 10/22/2024  Expected length of service: 1-3yrs unless revised      This treatment plan was formulated via my Physician/ Psychiatrist and I . I, Mary Ward , understand the goals and objectives listed . I agree to work on goals developed for my  treatment.     This note may have been written with the assistance of dictation software. Please excuse any grammatical  errors, misspellings,  and abnormal spacing of letters , sentences or paragraphs . For accurate interpretation should read note horizontally

## 2024-07-11 ENCOUNTER — TELEPHONE (OUTPATIENT)
Dept: PSYCHIATRY | Facility: CLINIC | Age: 84
End: 2024-07-11

## 2024-07-11 DIAGNOSIS — F32.A DEPRESSION, UNSPECIFIED DEPRESSION TYPE: ICD-10-CM

## 2024-07-11 RX ORDER — CITALOPRAM 40 MG/1
TABLET ORAL
Qty: 90 TABLET | Refills: 0 | Status: SHIPPED | OUTPATIENT
Start: 2024-07-11

## 2024-07-11 NOTE — TELEPHONE ENCOUNTER
Writer called client and left voicemail to inform them that Dr. Farmer has left the practice and that client will have to be rescheduled with a new provider. Elodiar cancelled scheduled appointment with Dr. Farmer. Writer asked client to call back with for any scheduling preferences or medication needs and let client know that intake will call them in regards to a transfer.

## 2024-07-11 NOTE — TELEPHONE ENCOUNTER
Pt in need of refill of citalopram 40mg  Can be sent to the [facility] on file

## 2024-07-18 ENCOUNTER — TELEPHONE (OUTPATIENT)
Dept: PSYCHIATRY | Facility: CLINIC | Age: 84
End: 2024-07-18

## 2024-08-05 DIAGNOSIS — F32.A DEPRESSION, UNSPECIFIED DEPRESSION TYPE: ICD-10-CM

## 2024-08-05 NOTE — TELEPHONE ENCOUNTER
Forwarding for covering provider review in Dr. Farmer's absence.       Clerical staff, please contact Mary for a FER[facility]IN[facility] appointment.

## 2024-08-06 RX ORDER — BUPROPION HYDROCHLORIDE 75 MG/1
TABLET ORAL
Qty: 90 TABLET | Refills: 0 | Status: SHIPPED | OUTPATIENT
Start: 2024-08-06

## 2024-10-10 DIAGNOSIS — F32.A DEPRESSION, UNSPECIFIED DEPRESSION TYPE: ICD-10-CM

## 2024-10-10 RX ORDER — CITALOPRAM HYDROBROMIDE 40 MG/1
TABLET ORAL
Qty: 90 TABLET | Refills: 0 | Status: SHIPPED | OUTPATIENT
Start: 2024-10-10

## 2024-10-24 NOTE — PSYCH
Psychiatric Medication Management - Behavioral Health   Mary Ward 84 y.o. female MRN: [id number]    Reason for Visit:   Chief Complaint   Patient presents with    Medication Management     Assessment & Plan  Anxiety  generally stable   Increase to Wellbutrin 75 mg BID or 150 mg in the morning to continue to help with anxiety and depression symptoms. Will try increasing dose to see if there is benefit. Option to discontinue this in the future if patient wishes to. Explained that she could try cutting the dose in half for 1-2 weeks and then stopping it if desired.   Continue Celexa 40 mg at bedtime to continue to help with anxiety and depression symptoms       Depression, unspecified depression type  Variable   Increase to Wellbutrin 75 mg BID or 150 mg in the morning to continue to help with anxiety and depression symptoms. Will try increasing dose to see if there is benefit. Option to discontinue this in the future if patient wishes to. Explained that she could try cutting the dose in half for 1-2 weeks and then stopping it if desired.   Continue Celexa 40 mg at bedtime to continue to help with anxiety and depression symptoms  Orders:    buPROPion (WELLBUTRIN) 75 mg tablet; Take 75 mg twice daily or take 150 mg in the morning if tolerated       Assessment/Plan:     Impression:  Anxiety, unspecified - generally stable   Depression, unspecified - variable      Increase to Wellbutrin 75 mg BID or 150 mg in the morning to continue to help with anxiety and depression symptoms. Will try increasing dose to see if there is benefit. Option to discontinue this in the future if patient wishes to. Explained that she could try cutting the dose in half for 1-2 weeks and then stopping it if desired.   Continue Celexa 40 mg at bedtime to continue to help with anxiety and depression symptoms  Medical follow up with PCP as needed  Follow up in 6 weeks      Treatment Plan: Both a treatment plan and a crisis plan were completed at  "today's visit. Patient verbally consented and signed both forms while in the office today. Next treatment plan due 4/28/2025. Next crisis plan due 10/28/2025.     Treatment Recommendations:      Risks, Benefits And Possible Side Effects Of Medications:  Risks, benefits, and possible side effects of medications explained to patient and family, they verbalize understanding    Controlled Medication Discussion:  Not applicable - controlled substances are not prescribed by this practice.        Subjective:  Medication compliance: yes  Medication side effects: chidi Hernandez is an 83 y/o female being seen for transfer of care appointment today, was previously seeing Dr. Farmer, last appointment was on 4/22/2024. Patient has psychiatric diagnoses including unspecified anxiety and depression. Patient is currently being observed on Wellbutrin 75 mg in the morning and Celexa 40 mg at bedtime. No outpatient therapy or additional services in place at this time.    Patient presents today reporting \"good\" mood. Patient reports that she was a little stressed due to needing to help out with [employer] at the senior care home but this is better now. Reports that she has been enjoying quilting and chatting with friends recently. Sleep has been really good, getting about 6-7 hours each night. Energy and motivation levels have been decent. Appetite has been adequate, eating about 2-3 meals a day. Patient reports that she eats more when she is stressed but this is manageable. Patient denies any significant mood swings, crying spells, aggression, or elevated moods. Mary reports that she gets irritated at times but this is overall tolerable. Patient rates their depression a 3-4/10 on a severity scale today and they rate their anxiety a 3-4/10. She states that these levels are very manageable for her and she has no major concerns today. Talked with patient about her Celexa and Wellbutrin medications. Mary reports that the Wellbutrin was " started somewhat recently and she has not noticed a difference since being on this medication. She states that she has been on the Celexa for a long time and she feels stable on this medication. Talked with patient about possibly discontinuing Wellbutrin or increasing to see if there is benefit. Patient feels comfortable increasing her dose at this time to either 75 mg twice daily or 150 mg in the morning. Patient reports that sometimes her depression and anxiety feel worse and she would be willing to try increasing the Wellbutrin to see if this helps. Mary does not have any other questions or concerns at this time. Denies SI/HI. Denies access to guns or weapons. Denies AH/VH. Encouraged patient to call the office with any questions or concerns. Mary feels comfortable following up in about 6 weeks.     Review Of Systems:     Constitutional Negative   ENT Negative   Cardiovascular Negative   Respiratory Negative   Gastrointestinal Negative   Genitourinary Negative   Musculoskeletal Negative   Integumentary Negative   Neurological Negative   Endocrine Negative     Past Medical History:   Patient Active Problem List   Diagnosis    Depression    Anxiety    Acquired hypothyroidism    Multinodular thyroid       Allergies:   Allergies   Allergen Reactions    Compazine [Prochlorperazine] Other (See Comments)     urinary isssues       Past Surgical History:   Past Surgical History:   Procedure Laterality Date    COLON SURGERY      TONSILLECTOMY      TUBAL LIGATION         Past Psychiatric History:   No inpatient hospitalizations    No therapy   Past patient of Dr. Farmer and Dr. Taylor   Past medication trials: Zoloft (lack of benefit)     Family Psychiatric History: Denies     Social History:   Living with  in alf home ([place])   Kids - 2 kids (Lori and Candelario)   Grandkids - 5   3 great grandkids (youngest is 2nd grade)   Hobbies = reading, quilting, sewing    Substance Abuse History:   Denies use of  "alcohol, nicotine, tobacco, caffeine, marijuana, or other illicit drugs.      Traumatic History:    Denies history of physical, verbal, sexual, and emotional abuse.    The following portions of the patient's history were reviewed and updated as appropriate: allergies, current medications, past family history, past medical history, past social history, past surgical history, and problem list.    Objective:  There were no vitals filed for this visit.      Weight (last 2 days)       None            Labs: N/A    Mental status:  Appearance sitting comfortably in chair, dressed in casual clothing, adequate hygiene and grooming, cooperative with interview, good eye contact   Mood \"Good\"   Affect Appears generally euthymic, stable, mood-congruent   Speech Normal rate, rhythm, and volume   Thought Processes Linear and goal directed   Associations intact associations   Hallucinations Denies any auditory or visual hallucinations   Thought Content No passive or active suicidal or homicidal ideation, intent, or plan.   Orientation Oriented to person, place, time, and situation   Recent and Remote Memory Grossly intact   Attention Span and Concentration Concentration intact   Intellect Appears to be of Average Intelligence   Insight Insight intact   Judgement judgment was intact   Muscle Strength Muscle strength and tone were normal, uses cane    Language Within normal limits   Fund of Knowledge Age appropriate   Pain mild     PHQ-A Depression Screening    Feeling down, depressed, irritable or hopeless: 0 - not at all  Little interest or pleasure in doing things: 1 - several days  Trouble falling or staying asleep, or sleeping too much: 0 - not at all  Poor appetite or overeatin - several days  Feeling tired or having little energy: 1 - several days  Feeling bad about yourself - or that you are a failure or have let yourself or your family down: 0 - not at all  Trouble concentrating on things, such as reading the newspaper or " watching television: 0 - not at all  Moving or speaking so slowly that other people could have noticed. Or the opposite - being so fidgety or restless that you have been moving around a lot more than usual: 0 - not at all  Thoughts that you would be better off dead, or of hurting yourself in some way: 0 - not at all          CANDIDA-7 Flowsheet Screening      Flowsheet Row Most Recent Value   Over the last two weeks, how often have you been bothered by the following problems?     Feeling nervous, anxious, or on edge 1   Not being able to stop or control worrying 1   Worrying too much about different things 0   Trouble relaxing  0   Being so restless that it's hard to sit still 0   Becoming easily annoyed or irritable  1   Feeling afraid as if something awful might happen 0   How difficult have these problems made it for you to do your work, take care of things at home, or get along with other people?  Not difficult at all   CANDIDA Score  3             Visit Time    Visit Start Time: 1427  Visit Stop Time: 1501  Total Visit Duration:  34 minutes      Susannah Michaels PA-C  10/28/24

## 2024-10-28 ENCOUNTER — OFFICE VISIT (OUTPATIENT)
Dept: PSYCHIATRY | Facility: CLINIC | Age: 84
End: 2024-10-28
Payer: MEDICARE

## 2024-10-28 DIAGNOSIS — F41.9 ANXIETY: Primary | ICD-10-CM

## 2024-10-28 DIAGNOSIS — F32.A DEPRESSION, UNSPECIFIED DEPRESSION TYPE: ICD-10-CM

## 2024-10-28 PROBLEM — E04.2 MULTINODULAR THYROID: Status: ACTIVE | Noted: 2024-08-20

## 2024-10-28 PROBLEM — E03.9 ACQUIRED HYPOTHYROIDISM: Status: ACTIVE | Noted: 2024-08-20

## 2024-10-28 PROCEDURE — 99214 OFFICE O/P EST MOD 30 MIN: CPT

## 2024-10-28 RX ORDER — BUPROPION HYDROCHLORIDE 75 MG/1
TABLET ORAL
Qty: 60 TABLET | Refills: 1 | Status: SHIPPED | OUTPATIENT
Start: 2024-10-28

## 2024-10-28 NOTE — BH CRISIS PLAN
Client Name: Mary Ward       Client YOB: 1940    [place] Safety Plan      Creation Date: 10/28/24 Update Date: 10/28/25   Created By: Susannah Michaels PA-C Last Updated By: Susannah Michaels PA-C      Step 1: Warning Signs:   Warning Signs   decreased energy/lack of motivation            Step 2: Internal Coping Strategies:   Internal Coping Strategies   quilting / sewing   listening to music            Step 3: People and social settings that provide distraction:   Name Contact Information    ([place]) live together   Leonidas (Lori) number in cell phone   Daughter-in-law (Telma) number in cell phone            Step 4: People whom I can ask for help during a crisis:      Name Contact Information     ([place]) live together    Leonidas (Lori) number in cell phone    Daughter-in-law (Telma) number in cell phone      Step 5: Professionals or agencies I can contact during a crisis:      [facility]/Agency Name Phone Emergency Contact    Susannah Michaels PA-C ([facility]) [phone]       Local Emergency Department Emergency Department Phone Emergency Department Address    911          Crisis Phone Numbers:   Suicide Prevention Lifeline: Call or Text  981 Crisis Text Line: Text HOME to 717-767   Please note: Some [facility] do not have a separate number for Child/Adolescent specific crisis. If your county is not listed under Child/Adolescent, please call the adult number for your county      Adult Crisis Numbers: Child/Adolescent Crisis Numbers   [facility]: [phone] [facility]: [phone]   [facility]: [phone] [facility]: [phone]   [facility]: [phone] [place], [place]: [phone]   [facility]: [phone] [place]: [phone]   [place] [facility]: [phone]   [facility]: [phone]   [facility]: [phone]   [facility]: [phone] (daytime) [phone]  (after hours, weekends, holidays)      Step 6: Making the environment safer (plan for lethal means safety):   Plan: No firearms in the house      Optional: What is most important to me and worth living for?      [facility] Safety Plan. Evelyn Vora and Serge Ayon. Used with permission of the authors.

## 2024-10-28 NOTE — ASSESSMENT & PLAN NOTE
generally stable   Increase to Wellbutrin 75 mg BID or 150 mg in the morning to continue to help with anxiety and depression symptoms. Will try increasing dose to see if there is benefit. Option to discontinue this in the future if patient wishes to. Explained that she could try cutting the dose in half for 1-2 weeks and then stopping it if desired.   Continue Celexa 40 mg at bedtime to continue to help with anxiety and depression symptoms

## 2024-10-28 NOTE — ASSESSMENT & PLAN NOTE
Variable   Increase to Wellbutrin 75 mg BID or 150 mg in the morning to continue to help with anxiety and depression symptoms. Will try increasing dose to see if there is benefit. Option to discontinue this in the future if patient wishes to. Explained that she could try cutting the dose in half for 1-2 weeks and then stopping it if desired.   Continue Celexa 40 mg at bedtime to continue to help with anxiety and depression symptoms  Orders:    buPROPion (WELLBUTRIN) 75 mg tablet; Take 75 mg twice daily or take 150 mg in the morning if tolerated

## 2024-10-28 NOTE — BH TREATMENT PLAN
TREATMENT PLAN (Medication Management Only)        [facility]    Name and Date of Birth:  Mary Ward 84 y.o. 1940  Date of Treatment Plan: October 28, 2024  Diagnosis/Diagnoses:    1. Anxiety    2. Depression, unspecified depression type      Strengths/Personal Resources for Self-Care: supportive family, taking medications as prescribed, ability to adapt to life changes.  Area/Areas of need (in own words): anxiety, depression  1. Long Term Goal: continue improvement in mood, maintain acceptable level of anxiety   Target Date:6 months - 4/28/2025  Person/Persons responsible for completion of goal: Mary  2.  Short Term Objective (s) - How will we reach this goal?:   A. Provider new recommended medication/dosage changes and/or continue medication(s): continue current medications as prescribed.  B. Attend medication management appointments regularly.  C. N/A.  Target Date:6 months - 4/28/2025  Person/Persons Responsible for Completion of Goal: Mary  Progress Towards Goals: stable  Treatment Modality: medication management every 2 months  Review due 180 days from date of this plan: 6 months - 4/28/2025  Expected length of service: ongoing treatment  My Physician/PA/NP and I have developed this plan together and I agree to work on the goals and objectives. I understand the treatment goals that were developed for my treatment.

## 2024-11-22 DIAGNOSIS — F32.A DEPRESSION, UNSPECIFIED DEPRESSION TYPE: ICD-10-CM

## 2024-11-25 RX ORDER — BUPROPION HYDROCHLORIDE 75 MG/1
TABLET ORAL
Qty: 180 TABLET | Refills: 1 | Status: SHIPPED | OUTPATIENT
Start: 2024-11-25

## 2024-12-09 ENCOUNTER — TELEPHONE (OUTPATIENT)
Dept: PSYCHIATRY | Facility: CLINIC | Age: 84
End: 2024-12-09

## 2024-12-09 NOTE — TELEPHONE ENCOUNTER
Completed call with patient regarding needing to reschedule appointment with Susannah Michaels due to flight being cancelled. Patient was successfully rescheduled.

## 2024-12-17 ENCOUNTER — TELEPHONE (OUTPATIENT)
Age: 84
End: 2024-12-17

## 2024-12-17 NOTE — TELEPHONE ENCOUNTER
Patient contacted the office to schedule a follow up visit with provider. Patient is now scheduled for 1/6/2025  at 12 pm in office.

## 2025-01-01 DIAGNOSIS — F32.A DEPRESSION, UNSPECIFIED DEPRESSION TYPE: ICD-10-CM

## 2025-01-02 RX ORDER — CITALOPRAM HYDROBROMIDE 40 MG/1
TABLET ORAL
Qty: 90 TABLET | Refills: 0 | Status: SHIPPED | OUTPATIENT
Start: 2025-01-02

## 2025-01-06 ENCOUNTER — TELEPHONE (OUTPATIENT)
Age: 85
End: 2025-01-06

## 2025-01-06 NOTE — TELEPHONE ENCOUNTER
Patient called office in regards to appointment today and wanting to r/s due to snow and not feeling safe to drive in it. Patient was able to be r/s for appointment      Susannah Michaels 1/30 @ 230p

## 2025-01-28 NOTE — PSYCH
Psychiatric Medication Management - Behavioral Health   Mary Ward 84 y.o. female MRN: [id number]    Reason for Visit:   Chief Complaint   Patient presents with    Medication Management     Assessment & Plan  Depression, unspecified depression type  Generally stable  Continue Wellbutrin 75 mg BID or 150 mg in the morning to continue to help with anxiety and depression symptoms. Will try increasing dose to see if there is benefit. Option to discontinue this in the future if patient wishes to. Explained that she could try cutting the dose in half for 1-2 weeks and then stopping it if desired.   Continue Celexa 40 mg at bedtime to continue to help with anxiety and depression symptoms         Anxiety  Generally stable   Continue Wellbutrin 75 mg BID or 150 mg in the morning to continue to help with anxiety and depression symptoms. Will try increasing dose to see if there is benefit. Option to discontinue this in the future if patient wishes to. Explained that she could try cutting the dose in half for 1-2 weeks and then stopping it if desired.   Continue Celexa 40 mg at bedtime to continue to help with anxiety and depression symptoms            Assessment/Plan:     Impression:  Anxiety, unspecified - generally stable   Depression, unspecified - generally stable      Continue Wellbutrin 75 mg BID or 150 mg in the morning to continue to help with anxiety and depression symptoms. Will try increasing dose to see if there is benefit. Option to discontinue this in the future if patient wishes to. Explained that she could try cutting the dose in half for 1-2 weeks and then stopping it if desired.   Continue Celexa 40 mg at bedtime to continue to help with anxiety and depression symptoms  Medical follow up with PCP as needed  Follow up in 3 months      Treatment Plan: Next treatment plan due 4/28/2025. Next crisis plan due 10/28/2025.     Treatment Recommendations:      Risks, Benefits And Possible Side Effects Of  "Medications:  Risks, benefits, and possible side effects of medications explained to patient and family, they verbalize understanding    Controlled Medication Discussion:  Not applicable - controlled substances are not prescribed by this practice.        Subjective:  Medication compliance: yes  Medication side effects: chidi Hernandez is an 85 y/o female being seen for medication management today. Patient has psychiatric diagnoses including unspecified anxiety and depression. Patient is currently being observed on Wellbutrin 150 mg in the morning and Celexa 40 mg at bedtime. No outpatient therapy or additional services in place at this time.    Patient presents today reporting \"good\" mood. She states that things have been going well overall and she doesn't have much to worry about. Mary helps her  throughout the day because he has Parkinson's but he has been doing well recently. She likes the assisting living where she resides because she feels like she has good support here. Sleep has been good, going to bed around 11:30 PM and waking up at 7:30-8:00 AM which is a good routine for her. No issues falling asleep or staying asleep. Energy and motivation levels have been pretty good. Patient continues to do quilting and has been enjoying this. Appetite has been good, eating 2-3 meals daily. Patient denies any significant mood swings, crying spells, irritability, aggression, or elevated moods. Patient rates their depression a 4-5/10 on a severity scale today and they rate their anxiety a 3-4/10. Mary explains that she has some down days but these are manageable for her. She states that anxiety comes in waves but she handles this well. She does not have any concerns today and wishes to stay on her medications. She might try splitting her dose of Wellbutrin to take it twice daily to see if this helps in the afternoons. Denies SI/HI. Denies access to guns or weapons. Denies AH/VH. Encouraged patient to call " "the office with any questions or concerns. Mary feels comfortable following up in about 3 months.     Review Of Systems:     Constitutional Negative   ENT Negative   Cardiovascular Negative   Respiratory Negative   Gastrointestinal Negative   Genitourinary Negative   Musculoskeletal Negative   Integumentary Negative   Neurological Negative   Endocrine Negative     Past Medical History:   Patient Active Problem List   Diagnosis    Depression    Anxiety    Acquired hypothyroidism    Multinodular thyroid       Allergies:   Allergies   Allergen Reactions    Compazine [Prochlorperazine] Other (See Comments)     urinary isssues       Past Surgical History:   Past Surgical History:   Procedure Laterality Date    COLON SURGERY      TONSILLECTOMY      TUBAL LIGATION         Past Psychiatric History:   No inpatient hospitalizations    No therapy   Past patient of Dr. Farmer and Dr. Taylor   Past medication trials: Zoloft (lack of benefit)     Family Psychiatric History: Denies     Social History:   Living with  in [facility] home ([place])   Kids - 2 kids (Lori and Candelario)   Grandkids - 5   3 great grandkids (youngest is 2nd grade)   Hobbies = reading, quilting, sewing    Substance Abuse History:   Denies use of alcohol, nicotine, tobacco, caffeine, marijuana, or other illicit drugs.      Traumatic History:    Denies history of physical, verbal, sexual, and emotional abuse.    The following portions of the patient's history were reviewed and updated as appropriate: allergies, current medications, past family history, past medical history, past social history, past surgical history, and problem list.    Objective:  There were no vitals filed for this visit.      Weight (last 2 days)       None          Labs: N/A    Mental status:  Appearance sitting comfortably in chair, dressed in casual clothing, adequate hygiene and grooming, cooperative with interview, good eye contact   Mood \"Good\"   Affect Appears generally " euthymic, stable, mood-congruent   Speech Normal rate, rhythm, and volume   Thought Processes Linear and goal directed   Associations intact associations   Hallucinations Denies any auditory or visual hallucinations   Thought Content No passive or active suicidal or homicidal ideation, intent, or plan.   Orientation Oriented to person, place, time, and situation   Recent and Remote Memory Grossly intact   Attention Span and Concentration Concentration intact   Intellect Appears to be of Average Intelligence   Insight Insight intact   Judgement judgment was intact   Muscle Strength Muscle strength and tone were normal, uses cane    Language Within normal limits   Fund of Knowledge Age appropriate   Pain mild     Visit Time    Visit Start Time: 1432  Visit Stop Time: 1446  Total Visit Duration:  14 minutes    Susannah Michaels PA-C  01/30/25

## 2025-01-30 ENCOUNTER — OFFICE VISIT (OUTPATIENT)
Dept: PSYCHIATRY | Facility: CLINIC | Age: 85
End: 2025-01-30
Payer: MEDICARE

## 2025-01-30 DIAGNOSIS — F32.A DEPRESSION, UNSPECIFIED DEPRESSION TYPE: Primary | ICD-10-CM

## 2025-01-30 DIAGNOSIS — F41.9 ANXIETY: ICD-10-CM

## 2025-01-30 PROCEDURE — 99214 OFFICE O/P EST MOD 30 MIN: CPT

## 2025-01-30 NOTE — ASSESSMENT & PLAN NOTE
Generally stable  Continue Wellbutrin 75 mg BID or 150 mg in the morning to continue to help with anxiety and depression symptoms. Will try increasing dose to see if there is benefit. Option to discontinue this in the future if patient wishes to. Explained that she could try cutting the dose in half for 1-2 weeks and then stopping it if desired.   Continue Celexa 40 mg at bedtime to continue to help with anxiety and depression symptoms

## 2025-03-03 DIAGNOSIS — F32.A DEPRESSION, UNSPECIFIED DEPRESSION TYPE: ICD-10-CM

## 2025-03-03 RX ORDER — BUPROPION HYDROCHLORIDE 75 MG/1
TABLET ORAL
Qty: 180 TABLET | Refills: 1 | Status: SHIPPED | OUTPATIENT
Start: 2025-03-03

## 2025-03-31 DIAGNOSIS — F32.A DEPRESSION, UNSPECIFIED DEPRESSION TYPE: ICD-10-CM

## 2025-03-31 RX ORDER — CITALOPRAM HYDROBROMIDE 40 MG/1
TABLET ORAL
Qty: 90 TABLET | Refills: 0 | Status: SHIPPED | OUTPATIENT
Start: 2025-03-31

## 2025-04-23 NOTE — PSYCH
MEDICATION MANAGEMENT NOTE    Name: Mary Ward      : 1940      MRN: [id number]  Encounter Provider: Susannah Michaels PA-C  Encounter Date: 2025   Encounter department: [facility] MENTAL [facility] OUTPATIENT    Insurance: Payor: MEDICARE / Plan: MEDICARE A AND B / Product Type: Medicare A & B Fee for Service /      Reason for Visit:   Chief Complaint   Patient presents with    Medication Management   :  Assessment & Plan  Depression, unspecified depression type  Generally stable   Continue Celexa 40 mg at bedtime to continue to help with anxiety and depression symptoms  Change to Wellbutrin  mg in the morning to continue to help with anxiety and depression symptoms    Orders:    citalopram (CeleXA) 40 mg tablet; TAKE ONE TABLET BY MOUTH AT NIGHT    buPROPion (Wellbutrin XL) 150 mg 24 hr tablet; Take 1 tablet (150 mg total) by mouth daily    Anxiety  Generally stable   Continue Celexa 40 mg at bedtime to continue to help with anxiety and depression symptoms  Change to Wellbutrin  mg in the morning to continue to help with anxiety and depression symptoms           Assessment/Plan:      Impression:  Anxiety, unspecified - generally stable   Depression, unspecified - generally stable      Change to Wellbutrin  mg in the morning to continue to help with anxiety and depression symptoms  Continue Celexa 40 mg at bedtime to continue to help with anxiety and depression symptoms  Medical follow up with PCP as needed  Follow up in 2 months     Treatment Recommendations:    Educated about diagnosis and treatment modalities. Verbalizes understanding and agreement with the treatment plan.  Discussed self monitoring of symptoms, and symptom monitoring tools.  Discussed medications and if treatment adjustment was needed or desired.  Aware of 24 hour and weekend coverage for urgent situations accessed by calling [facility] main practice number  I am  "scheduling this patient out for greater than 3 months: No    Medications Risks/Benefits:      Risks, Benefits And Possible Side Effects Of Medications:    Risks, benefits, and possible side effects of medications explained to Mary and she (or legal representative) verbalizes understanding and agreement for treatment.    Controlled Medication Discussion:     Not applicable - controlled prescriptions are not prescribed by this practice.      History of Present Illness     Subjective:  Medication compliance: yes  Medication side effects: denies      Mary is an 83 y/o female being seen for medication management today. Patient has psychiatric diagnoses including unspecified anxiety and depression. Patient is currently being observed on Wellbutrin 150 mg in the morning and Celexa 40 mg at bedtime. No outpatient therapy or additional services in place at this time.    Patient presents today reporting \"okay\" mood. She states that she had to make the decision today on whether to get her knee replaced or live with the pain. Talked with patient about this and provided support. She reports that she may go for the replacement option. Mary continues to help take care of her  with Parkinson's and this wears on her sometimes. Reports that her family has been doing well and she enjoyed her Easter. Sleep has been good, going to bed around 11:30 PM and waking up at 7:30 AM. No issues falling asleep or staying asleep. Energy and motivation levels have been low. She states that she continues to do quilting and visit with friends and family. She is able to get things done but sometimes feels worn out. Appetite has been okay, eating 2-3 meals daily. Patient denies any significant crying spells, irritability, aggression, or elevated moods. Reports some mood swings where she will feel good but then not feel like doing anything. Patient rates their depression a 5/10 on a severity scale today and they rate their anxiety a 5/10. " Reports having some good days and some bad days. Talked about her medications and she would be willing to trial Wellbutrin  mg in the morning. She reports that life gets her down sometimes but she is hanging in there. She does not have any other concerns today. Denies SI/HI. Denies access to guns or weapons. Denies AH/VH. Encouraged patient to call the office with any questions or concerns. Mary feels comfortable following up in about 2 months.     Review Of Systems: A review of systems is obtained and is negative except for the pertinent positives listed in HPI/Subjective above.      Current Rating Scores:     Not Applicable  None completed today.    Areas of Improvement: reviewed in HPI/Subjective Section and reviewed in Assessment and Plan Section      Past Medical History:   Diagnosis Date    Hypothyroid     PVC (premature ventricular contraction)      Past Surgical History:   Procedure Laterality Date    COLON SURGERY      TONSILLECTOMY      TUBAL LIGATION       Allergies:   Allergies   Allergen Reactions    Compazine [Prochlorperazine] Other (See Comments)     urinary isssues       Current Outpatient Medications   Medication Instructions    buPROPion (WELLBUTRIN) 75 mg tablet TAKE 75 MG TWICE DAILY OR TAKE 150 MG IN THE MORNING IF TOLERATED    Cholecalciferol 25 MCG (1000 UT) capsule 1 capsule, Daily    citalopram (CeleXA) 40 mg tablet TAKE ONE TABLET BY MOUTH AT NIGHT    levothyroxine 75 mcg, Daily    metoprolol succinate (TOPROL-XL) 25 mg, Daily    Zinc 50 mg, Every 24 hours      Past Psychiatric History:   No inpatient hospitalizations    No therapy   Past patient of Dr. Farmer and Dr. Taylor   Past medication trials: Zoloft (lack of benefit)     Substance Abuse History:   Denies use of alcohol, nicotine, tobacco, caffeine, marijuana, or other illicit drugs.       Traumatic History:    Denies history of physical, verbal, sexual, and emotional abuse.    Substance Abuse History:    Tobacco, Alcohol  and Drug Use History     Tobacco Use    Smoking status: Never    Smokeless tobacco: Never   Substance Use Topics    Alcohol use: Not on file    Drug use: Not on file      Social History:   Living with  in [facility] home ([place])   Kids - 2 kids ([place])   Grandkids - 5   3 great grandkids (youngest is 2nd grade)   Hobbies = reading, quilting, sewing    Social History:    Social History     Socioeconomic History    Marital status: Unknown     Spouse name: Not on file    Number of children: Not on file    Years of education: Not on file    Highest education level: Not on file   Occupational History    Not on file   Other Topics Concern    Not on file   Social History Narrative    Not on file        Family Psychiatric History:     History reviewed. No pertinent family history.    Medical History Reviewed by provider this encounter:  Tobacco  Allergies  Meds  Problems  Med Hx  Surg Hx  Fam Hx          Objective   There were no vitals taken for this visit.     Mental Status Evaluation:    Appearance age appropriate, casually dressed   Behavior cooperative, calm   Speech normal rate, normal volume, normal pitch, spontaneous   Mood euthymic   Affect normal range and intensity, appropriate   Thought Processes organized, goal directed   Thought Content no overt delusions   Perceptual Disturbances: no auditory hallucinations, no visual hallucinations   Abnormal Thoughts  Risk Potential Suicidal ideation - None  Homicidal ideation - None  Potential for aggression - No   Orientation oriented to person, place, time/date, and situation   Memory recent and remote memory grossly intact   Consciousness alert and awake   Attention Span Concentration Span attention span and concentration are age appropriate   Intellect appears to be of average intelligence   Insight intact   Judgement intact   Muscle Strength and  Gait normal muscle strength and normal muscle tone, normal gait and normal balance   Motor  "activity no abnormal movements   Language no difficulty naming common objects, no difficulty repeating a phrase, no difficulty writing a sentence   Fund of Knowledge adequate knowledge of current events  adequate fund of knowledge regarding past history  adequate fund of knowledge regarding vocabulary        Laboratory Results: I have personally reviewed all pertinent laboratory/tests results    Recent Labs (last 12 months):   No visits with results within 12 Month(s) from this visit.   Latest known visit with results is:   No results found for any previous visit.       Suicide/Homicide Risk Assessment:    Risk of Harm to Self:  Based on today's assessment, Mary presents the following risk of harm to self: minimal    Risk of Harm to Others:  Based on today's assessment, Mary presents the following risk of harm to others: minimal    The following interventions are recommended: Continue medication management. No other intervention changes indicated at this time.    Psychotherapy Provided:     Individual psychotherapy provided: No    Treatment Plan:    Completed and signed during the session: Yes - with Mary.    Goals: Progress towards Treatment Plan goals - Yes, progressing, as evidenced by subjective findings in HPI/Subjective Section and in Assessment and Plan Section    Depression Follow-up Plan Completed: Yes    Note Share:    This note was shared with patient.    Administrative Statements   Administrative Statements   I have spent a total time of 18 minutes in caring for this patient on the day of the visit/encounter including Prognosis, Risks and benefits of tx options, Instructions for management, Patient and family education, Importance of tx compliance, and Documenting in the medical record.    Visit Time  Visit Start Time: 1432  Visit Stop Time: 1450  Total Visit Duration:  18 minutes    Portions of the record may have been created with voice recognition software. Occasional wrong word or \"sound a " "like\" substitutions may have occurred due to the inherent limitations of voice recognition software. Read the chart carefully and recognize, using context, where substitutions have occurred.    Susannah Michaels PA-C 04/24/25  "

## 2025-04-24 ENCOUNTER — OFFICE VISIT (OUTPATIENT)
Dept: PSYCHIATRY | Facility: CLINIC | Age: 85
End: 2025-04-24
Payer: MEDICARE

## 2025-04-24 DIAGNOSIS — F32.A DEPRESSION, UNSPECIFIED DEPRESSION TYPE: Primary | ICD-10-CM

## 2025-04-24 DIAGNOSIS — F41.9 ANXIETY: ICD-10-CM

## 2025-04-24 PROCEDURE — 99214 OFFICE O/P EST MOD 30 MIN: CPT

## 2025-04-24 RX ORDER — BUPROPION HYDROCHLORIDE 150 MG/1
150 TABLET ORAL DAILY
Qty: 30 TABLET | Refills: 1 | Status: SHIPPED | OUTPATIENT
Start: 2025-04-24

## 2025-04-24 RX ORDER — CITALOPRAM HYDROBROMIDE 40 MG/1
TABLET ORAL
Qty: 90 TABLET | Refills: 1 | Status: SHIPPED | OUTPATIENT
Start: 2025-04-24

## 2025-04-24 NOTE — ASSESSMENT & PLAN NOTE
Generally stable   Continue Celexa 40 mg at bedtime to continue to help with anxiety and depression symptoms  Change to Wellbutrin  mg in the morning to continue to help with anxiety and depression symptoms

## 2025-04-24 NOTE — ASSESSMENT & PLAN NOTE
Generally stable   Continue Celexa 40 mg at bedtime to continue to help with anxiety and depression symptoms  Change to Wellbutrin  mg in the morning to continue to help with anxiety and depression symptoms    Orders:    citalopram (CeleXA) 40 mg tablet; TAKE ONE TABLET BY MOUTH AT NIGHT    buPROPion (Wellbutrin XL) 150 mg 24 hr tablet; Take 1 tablet (150 mg total) by mouth daily

## 2025-04-24 NOTE — BH TREATMENT PLAN
TREATMENT PLAN (Medication Management Only)        [facility]    Name and Date of Birth:  Mary Wrad 84 y.o. 1940  MRN: [id number]  Date of Treatment Plan: April 24, 2025  Diagnosis/Diagnoses:    1. Depression, unspecified depression type    2. Anxiety      Strengths/Personal Resources for Self-Care: supportive family, taking medications as prescribed.  Area/Areas of need (in own words): anxiety, depression  1. Long Term Goal:   continue improvement in mood.  Target Date:6 months - 10/24/2025  Person/Persons responsible for completion of goal: Mary  2.  Short Term Objective (s) - How will we reach this goal?:   A.  Provider new recommended medication/dosage changes and/or continue medication(s): continue current medications as prescribed.  B.  Attend medication management appointments regularly..  C.  N/A.  Target Date:6 months - 10/24/2025  Person/Persons Responsible for Completion of Goal: Mary  Progress Towards Goals: continuing treatment  Treatment Modality: medication management every 3 months  Review due 180 days from date of this plan: 6 months - 10/24/2025  Expected length of service: ongoing treatment unless revised  My Physician/PA/NP and I have developed this plan together and I agree to work on the goals and objectives. I understand the treatment goals that were developed for my treatment.   Electronic Signatures: on file (unless signed below)    Susannah Michaels PA-C 04/24/25

## 2025-06-14 DIAGNOSIS — F32.A DEPRESSION, UNSPECIFIED DEPRESSION TYPE: ICD-10-CM

## 2025-06-16 RX ORDER — BUPROPION HYDROCHLORIDE 150 MG/1
150 TABLET ORAL DAILY
Qty: 90 TABLET | Refills: 1 | OUTPATIENT
Start: 2025-06-16
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
## 2025-06-18 DIAGNOSIS — F32.A DEPRESSION, UNSPECIFIED DEPRESSION TYPE: ICD-10-CM

## 2025-06-19 RX ORDER — BUPROPION HYDROCHLORIDE 150 MG/1
150 TABLET ORAL DAILY
Qty: 30 TABLET | Refills: 1 | Status: SHIPPED | OUTPATIENT
Start: 2025-06-19

## 2025-06-24 ENCOUNTER — OFFICE VISIT (OUTPATIENT)
Dept: PSYCHIATRY | Facility: CLINIC | Age: 85
End: 2025-06-24
Payer: MEDICARE

## 2025-06-24 DIAGNOSIS — F32.A DEPRESSION, UNSPECIFIED DEPRESSION TYPE: Primary | ICD-10-CM

## 2025-06-24 DIAGNOSIS — F41.9 ANXIETY: ICD-10-CM

## 2025-06-24 PROCEDURE — 99214 OFFICE O/P EST MOD 30 MIN: CPT

## 2025-06-24 NOTE — ASSESSMENT & PLAN NOTE
Generally stable   Continue Celexa 40 mg at bedtime to continue to help with anxiety and depression symptoms  Continue Wellbutrin  mg in the morning to continue to help with anxiety and depression symptoms

## 2025-07-18 DIAGNOSIS — F32.A DEPRESSION, UNSPECIFIED DEPRESSION TYPE: ICD-10-CM

## 2025-07-18 RX ORDER — BUPROPION HYDROCHLORIDE 150 MG/1
150 TABLET ORAL DAILY
Qty: 90 TABLET | Refills: 0 | Status: SHIPPED | OUTPATIENT
Start: 2025-07-18

## 2025-07-23 ENCOUNTER — TELEPHONE (OUTPATIENT)
Age: 85
End: 2025-07-23

## 2025-07-23 NOTE — TELEPHONE ENCOUNTER
Caller: patient    Doctor: Jaci Larios    Reason for call: patient only wants to be seen for her pelvis at this time.    Call back#: na

## 2025-07-24 ENCOUNTER — TELEPHONE (OUTPATIENT)
Age: 85
End: 2025-07-24

## 2025-07-25 ENCOUNTER — OFFICE VISIT (OUTPATIENT)
Age: 85
End: 2025-07-25

## 2025-07-25 VITALS — HEIGHT: 64 IN | WEIGHT: 151 LBS | BODY MASS INDEX: 25.78 KG/M2

## 2025-07-25 DIAGNOSIS — M47.9 SPONDYLOSIS: ICD-10-CM

## 2025-07-25 DIAGNOSIS — Z87.310 H/O HEALED FRAGILITY FRACTURE: ICD-10-CM

## 2025-07-25 DIAGNOSIS — S32.592A PUBIC RAMUS FRACTURE, LEFT, CLOSED, INITIAL ENCOUNTER (HCC): Primary | ICD-10-CM

## 2025-07-25 RX ORDER — TRIAMCINOLONE ACETONIDE 40 MG/ML
40 INJECTION, SUSPENSION INTRA-ARTICULAR; INTRAMUSCULAR
Status: COMPLETED | OUTPATIENT
Start: 2025-07-25 | End: 2025-07-25

## 2025-07-25 RX ORDER — TRAMADOL HYDROCHLORIDE 50 MG/1
50 TABLET ORAL EVERY 6 HOURS PRN
Qty: 20 TABLET | Refills: 0 | Status: SHIPPED | OUTPATIENT
Start: 2025-07-25

## 2025-07-25 RX ORDER — LIDOCAINE HYDROCHLORIDE 10 MG/ML
1 INJECTION, SOLUTION INFILTRATION; PERINEURAL
Status: COMPLETED | OUTPATIENT
Start: 2025-07-25 | End: 2025-07-25

## 2025-07-25 RX ADMIN — LIDOCAINE HYDROCHLORIDE 1 ML: 10 INJECTION, SOLUTION INFILTRATION; PERINEURAL at 13:30

## 2025-07-25 RX ADMIN — TRIAMCINOLONE ACETONIDE 40 MG: 40 INJECTION, SUSPENSION INTRA-ARTICULAR; INTRAMUSCULAR at 13:30

## 2025-07-25 NOTE — PROGRESS NOTES
Name: Mary Ward      : 1940       MRN: [id number]   Encounter Provider: Kaleb Johnson III, MD   Encounter Date: 25  [facility]ounter [facility]        Assessment & Plan  Spondylosis    Orders:    Inj Trigger Point Single/Multiple    Pubic ramus fracture, left, closed, initial encounter ([facility])    Orders:    traMADol (Ultram) 50 mg tablet; Take 1 tablet (50 mg total) by mouth every 6 (six) hours as needed for moderate pain    DXA bone density spine hip and pelvis; Future             History of Present Illness    Mary Ward is a 84 y.o. female who presents patient comes in with complaint of right-sided groin pain.  Fell a week ago.  Ground-level fall.  Noted acute onset pain.  Urgent care.  X-rays were obtained.  Reviewed all x-rays images personally.  X-rays reveal nondisplaced right superior and inferior pubic ramus fractures.    Patient also complaining of lower back pain.  History of lumbar spondylosis.  Pain lower back on the left side.  Denies any radiation of the pain.  No back pain.  No numbness or tingling legs or feet.      Previous pain management care:   Previous physical therapy in the past 6 months for involved area:     ALLERGIES: Allergies[1]    MEDICATIONS:  Current Medications[2]     PAST MEDICAL HISTORY:   Past Medical History[3]    PAST SURGICAL HISTORY:  Past Surgical History[4]    SOCIAL HISTORY:  Tobacco Use History[5]     Physical Exam    84 y.o. female sitting comfortably on exam chair in no apparent distress.   Patient ambulates with walker.  Normal sagittal and coronal balance.          TTP over  lumbar spine.     Sensation intact to light touch left L2 through S1 dermatomes.   Sensation intact to light touch right L2 through S1 dermatomes     Lower Extremity Motor Function    Right  Left    Iliopsoas  5/5  5/5    Quadriceps 5/5 5/5   Tibialis anterior  5/5  5/5    EHL  5/5  5/5     Gastroc. muscle  5/5  5/5        ROM:  Decreased      The patient is well perfused distally.       RADIOGRAPHIC STUDIES:  Reviewed x-rays pelvis obtained in urgent care.  Show right sided superior and inferior pubic ramus fractures in good alignment        Scribe Attestation      I,:   am acting as a scribe while in the presence of the attending physician.:       I,:   personally performed the services described in this documentation    as scribed in my presence.:                                 Universal Protocol:  Consent: Verbal consent obtained  Risks and benefits: risks, benefits and alternatives were discussed  Patient understanding: patient states understanding of the procedure being performed  Site marked: the operative site was marked  Patient identity confirmed: verbally with patient  Supporting Documentation  Indications: pain   Trigger Point Injections: single/multiple trigger point(s): 1-2 muscle groups   Procedure Details  Location(s):    Lower Back: L lumbar paraspinals   Needle size: 22 G  Medications: 1 mL lidocaine 1 %; 40 mg triamcinolone acetonide 40 mg/mL  Patient tolerance: patient tolerated the procedure well with no immediate complications      Fracture / Dislocation Treatment    Date/Time: 7/25/2025 1:30 PM    Performed by: Kaleb Johnson III, MD  Authorized by: Kaleb Johnson III, MD    Universal Protocol:  Consent: Verbal consent obtained  Risks and benefits: risks, benefits and alternatives were discussed  Consent given by: patient  Patient understanding: patient states understanding of the procedure being performed  Patient identity confirmed: verbally with patient    Injury location:  Pelvis  Location details:  Pubis  Injury type:  Fracture  Fracture type: stable pelvic ring    Neurovascular status: Neurovascularly intact    Manipulation performed?: No    Neurovascular status: Neurovascularly intact    Patient tolerance:  Patient tolerated the procedure well with no immediate  complications           [1]   Allergies  Allergen Reactions    Compazine [Prochlorperazine] Other (See Comments)     urinary isssues   [2]   Current Outpatient Medications:     buPROPion (WELLBUTRIN XL) 150 mg 24 hr tablet, TAKE 1 TABLET BY MOUTH EVERY DAY, Disp: 90 tablet, Rfl: 0    Cholecalciferol 25 MCG (1000 UT) capsule, Take 1 capsule by mouth in the morning., Disp: , Rfl:     citalopram (CeleXA) 40 mg tablet, TAKE ONE TABLET BY MOUTH AT NIGHT, Disp: 90 tablet, Rfl: 1    levothyroxine 75 mcg tablet, Take 75 mcg by mouth in the morning., Disp: , Rfl:     metoprolol succinate (TOPROL-XL) 25 mg 24 hr tablet, Take 25 mg by mouth in the morning., Disp: , Rfl:     Zinc 50 MG TABS, Take 50 mg by mouth every 24 hours, Disp: , Rfl:   [3]   Past Medical History:  Diagnosis Date    Anxiety     Aortic insufficiency     Closed displaced fracture of left pubis (HCC)     Colon cancer (HCC)     Fracture of superior pubic ramus (HCC)     Hypertension     Hypothyroid     Proximal phalanx fracture of finger     RING FINGER    PVC (premature ventricular contraction)     Radial head fracture, closed     RBBB     Sacral insufficiency fracture     SVT (supraventricular tachycardia) (HCC)    [4]   Past Surgical History:  Procedure Laterality Date    COLECTOMY N/A     COLON SURGERY      JOINT REPLACEMENT      TONSILLECTOMY      TUBAL LIGATION     [5]   Social History  Tobacco Use   Smoking Status Never   Smokeless Tobacco Never

## 2025-07-25 NOTE — ASSESSMENT & PLAN NOTE
Orders:    traMADol (Ultram) 50 mg tablet; Take 1 tablet (50 mg total) by mouth every 6 (six) hours as needed for moderate pain    DXA bone density spine hip and pelvis; Future

## 2025-07-31 ENCOUNTER — OFFICE VISIT (OUTPATIENT)
Age: 85
End: 2025-07-31
Payer: MEDICARE

## 2025-07-31 VITALS — BODY MASS INDEX: 25.33 KG/M2 | WEIGHT: 148.4 LBS | HEIGHT: 64 IN

## 2025-07-31 DIAGNOSIS — S32.592A PUBIC RAMUS FRACTURE, LEFT, CLOSED, INITIAL ENCOUNTER (HCC): Primary | ICD-10-CM

## 2025-07-31 PROCEDURE — 99213 OFFICE O/P EST LOW 20 MIN: CPT | Performed by: ORTHOPAEDIC SURGERY

## 2025-07-31 RX ORDER — TRAMADOL HYDROCHLORIDE 50 MG/1
50 TABLET ORAL EVERY 6 HOURS PRN
Qty: 30 TABLET | Refills: 0 | Status: SHIPPED | OUTPATIENT
Start: 2025-07-31 | End: 2025-08-04

## 2025-08-04 ENCOUNTER — OFFICE VISIT (OUTPATIENT)
Age: 85
End: 2025-08-04
Payer: MEDICARE

## 2025-08-04 ENCOUNTER — APPOINTMENT (OUTPATIENT)
Age: 85
End: 2025-08-04
Payer: MEDICARE

## 2025-08-04 VITALS — HEIGHT: 64 IN | BODY MASS INDEX: 25.27 KG/M2 | WEIGHT: 148 LBS

## 2025-08-04 DIAGNOSIS — S32.592A PUBIC RAMUS FRACTURE, LEFT, CLOSED, INITIAL ENCOUNTER (HCC): Primary | ICD-10-CM

## 2025-08-04 DIAGNOSIS — S32.592A PUBIC RAMUS FRACTURE, LEFT, CLOSED, INITIAL ENCOUNTER (HCC): ICD-10-CM

## 2025-08-04 PROCEDURE — 72190 X-RAY EXAM OF PELVIS: CPT

## 2025-08-04 PROCEDURE — 99213 OFFICE O/P EST LOW 20 MIN: CPT

## 2025-08-04 RX ORDER — TRAMADOL HYDROCHLORIDE 50 MG/1
50 TABLET ORAL EVERY 6 HOURS PRN
Qty: 20 TABLET | Refills: 0 | Status: SHIPPED | OUTPATIENT
Start: 2025-08-04 | End: 2025-08-05

## 2025-08-05 ENCOUNTER — TELEPHONE (OUTPATIENT)
Age: 85
End: 2025-08-05

## 2025-08-05 DIAGNOSIS — S32.592A PUBIC RAMUS FRACTURE, LEFT, CLOSED, INITIAL ENCOUNTER (HCC): ICD-10-CM

## 2025-08-06 RX ORDER — TRAMADOL HYDROCHLORIDE 50 MG/1
100 TABLET ORAL EVERY 6 HOURS PRN
Qty: 30 TABLET | Refills: 0 | Status: SHIPPED | OUTPATIENT
Start: 2025-08-06

## 2025-08-06 RX ORDER — TRAMADOL HYDROCHLORIDE 50 MG/1
50 TABLET ORAL EVERY 6 HOURS PRN
Qty: 20 TABLET | Refills: 0 | Status: SHIPPED | OUTPATIENT
Start: 2025-08-06 | End: 2025-08-06

## 2025-08-09 ENCOUNTER — HOSPITAL ENCOUNTER (EMERGENCY)
Age: 85
Discharge: HOME | End: 2025-08-09
Attending: EMERGENCY MEDICINE | Admitting: EMERGENCY MEDICINE
Payer: MEDICARE

## 2025-08-13 ENCOUNTER — TELEPHONE (OUTPATIENT)
Age: 85
End: 2025-08-13

## 2025-08-20 ENCOUNTER — TELEPHONE (OUTPATIENT)
Age: 85
End: 2025-08-20

## 2025-08-22 ENCOUNTER — OFFICE VISIT (OUTPATIENT)
Age: 85
End: 2025-08-22
Payer: MEDICARE

## 2025-08-22 VITALS — BODY MASS INDEX: 24.32 KG/M2 | HEIGHT: 65 IN | WEIGHT: 146 LBS

## 2025-08-22 DIAGNOSIS — S32.592A PUBIC RAMUS FRACTURE, LEFT, CLOSED, INITIAL ENCOUNTER (HCC): Primary | ICD-10-CM

## 2025-08-22 PROCEDURE — 99213 OFFICE O/P EST LOW 20 MIN: CPT

## 2025-08-22 RX ORDER — NAPROXEN 500 MG/1
TABLET ORAL EVERY 12 HOURS PRN
COMMUNITY

## 2025-08-22 RX ORDER — SENNOSIDES 8.6 MG
2 CAPSULE ORAL 3 TIMES DAILY
COMMUNITY
Start: 2025-08-08

## 2025-08-22 RX ORDER — TIZANIDINE HYDROCHLORIDE 4 MG/1
4 CAPSULE, GELATIN COATED ORAL 3 TIMES DAILY
COMMUNITY